# Patient Record
Sex: FEMALE | Race: BLACK OR AFRICAN AMERICAN | NOT HISPANIC OR LATINO | Employment: UNEMPLOYED | URBAN - METROPOLITAN AREA
[De-identification: names, ages, dates, MRNs, and addresses within clinical notes are randomized per-mention and may not be internally consistent; named-entity substitution may affect disease eponyms.]

---

## 2023-11-08 DIAGNOSIS — T14.8XXA MUSCLE STRAIN: Primary | ICD-10-CM

## 2023-11-09 DIAGNOSIS — I10 PRIMARY HYPERTENSION: Primary | ICD-10-CM

## 2023-11-09 RX ORDER — CYCLOBENZAPRINE HCL 5 MG
TABLET ORAL
Qty: 20 TABLET | Refills: 4 | Status: SHIPPED | OUTPATIENT
Start: 2023-11-09

## 2023-11-13 RX ORDER — CHLORTHALIDONE 25 MG/1
25 TABLET ORAL DAILY
Qty: 30 TABLET | Refills: 1 | Status: SHIPPED | OUTPATIENT
Start: 2023-11-13

## 2023-11-13 RX ORDER — LOSARTAN POTASSIUM 50 MG/1
50 TABLET ORAL DAILY
Qty: 30 TABLET | Refills: 1 | Status: SHIPPED | OUTPATIENT
Start: 2023-11-13

## 2024-01-24 ENCOUNTER — HOSPITAL ENCOUNTER (EMERGENCY)
Facility: HOSPITAL | Age: 54
Discharge: HOME | End: 2024-01-24
Payer: COMMERCIAL

## 2024-01-24 VITALS
OXYGEN SATURATION: 100 % | SYSTOLIC BLOOD PRESSURE: 142 MMHG | TEMPERATURE: 97.7 F | DIASTOLIC BLOOD PRESSURE: 94 MMHG | WEIGHT: 187 LBS | HEART RATE: 110 BPM | HEIGHT: 63 IN | BODY MASS INDEX: 33.13 KG/M2 | RESPIRATION RATE: 16 BRPM

## 2024-01-24 DIAGNOSIS — Z20.2 STD EXPOSURE: Primary | ICD-10-CM

## 2024-01-24 LAB
APPEARANCE UR: ABNORMAL
BILIRUB UR STRIP.AUTO-MCNC: NEGATIVE MG/DL
C TRACH RRNA SPEC QL NAA+PROBE: NEGATIVE
CLUE CELLS SPEC QL WET PREP: ABNORMAL
COLOR UR: YELLOW
GLUCOSE UR STRIP.AUTO-MCNC: NEGATIVE MG/DL
HOLD SPECIMEN: NORMAL
KETONES UR STRIP.AUTO-MCNC: NEGATIVE MG/DL
LEUKOCYTE ESTERASE UR QL STRIP.AUTO: ABNORMAL
N GONORRHOEA DNA SPEC QL PROBE+SIG AMP: NEGATIVE
NITRITE UR QL STRIP.AUTO: NEGATIVE
PH UR STRIP.AUTO: 6 [PH]
PROT UR STRIP.AUTO-MCNC: NEGATIVE MG/DL
RBC # UR STRIP.AUTO: NEGATIVE /UL
RBC #/AREA URNS AUTO: NORMAL /HPF
SP GR UR STRIP.AUTO: 1.02
SQUAMOUS #/AREA URNS AUTO: NORMAL /HPF
T VAGINALIS SPEC QL WET PREP: PRESENT
UROBILINOGEN UR STRIP.AUTO-MCNC: <2 MG/DL
WBC #/AREA URNS AUTO: NORMAL /HPF
WBC VAG QL WET PREP: ABNORMAL
YEAST VAG QL WET PREP: ABNORMAL

## 2024-01-24 PROCEDURE — 2500000001 HC RX 250 WO HCPCS SELF ADMINISTERED DRUGS (ALT 637 FOR MEDICARE OP): Performed by: PHYSICIAN ASSISTANT

## 2024-01-24 PROCEDURE — 99284 EMERGENCY DEPT VISIT MOD MDM: CPT | Performed by: PHYSICIAN ASSISTANT

## 2024-01-24 PROCEDURE — 99284 EMERGENCY DEPT VISIT MOD MDM: CPT

## 2024-01-24 PROCEDURE — 99283 EMERGENCY DEPT VISIT LOW MDM: CPT | Mod: 25

## 2024-01-24 PROCEDURE — 96372 THER/PROPH/DIAG INJ SC/IM: CPT

## 2024-01-24 PROCEDURE — 2500000004 HC RX 250 GENERAL PHARMACY W/ HCPCS (ALT 636 FOR OP/ED): Performed by: PHYSICIAN ASSISTANT

## 2024-01-24 PROCEDURE — 87800 DETECT AGNT MULT DNA DIREC: CPT | Performed by: PHYSICIAN ASSISTANT

## 2024-01-24 PROCEDURE — 81001 URINALYSIS AUTO W/SCOPE: CPT | Performed by: PHYSICIAN ASSISTANT

## 2024-01-24 PROCEDURE — 87086 URINE CULTURE/COLONY COUNT: CPT | Performed by: PHYSICIAN ASSISTANT

## 2024-01-24 PROCEDURE — 87210 SMEAR WET MOUNT SALINE/INK: CPT | Performed by: PHYSICIAN ASSISTANT

## 2024-01-24 RX ORDER — CEFTRIAXONE 500 MG/1
500 INJECTION, POWDER, FOR SOLUTION INTRAMUSCULAR; INTRAVENOUS ONCE
Status: COMPLETED | OUTPATIENT
Start: 2024-01-24 | End: 2024-01-24

## 2024-01-24 RX ORDER — METRONIDAZOLE 500 MG/1
2000 TABLET ORAL ONCE
Status: COMPLETED | OUTPATIENT
Start: 2024-01-24 | End: 2024-01-24

## 2024-01-24 RX ORDER — AZITHROMYCIN 500 MG/1
1000 TABLET, FILM COATED ORAL ONCE
Status: COMPLETED | OUTPATIENT
Start: 2024-01-24 | End: 2024-01-24

## 2024-01-24 RX ADMIN — AZITHROMYCIN DIHYDRATE 1000 MG: 500 TABLET ORAL at 09:42

## 2024-01-24 RX ADMIN — METRONIDAZOLE 2000 MG: 500 TABLET ORAL at 09:42

## 2024-01-24 RX ADMIN — CEFTRIAXONE SODIUM 500 MG: 500 INJECTION, POWDER, FOR SOLUTION INTRAMUSCULAR; INTRAVENOUS at 09:43

## 2024-01-24 ASSESSMENT — COLUMBIA-SUICIDE SEVERITY RATING SCALE - C-SSRS
1. IN THE PAST MONTH, HAVE YOU WISHED YOU WERE DEAD OR WISHED YOU COULD GO TO SLEEP AND NOT WAKE UP?: NO
6. HAVE YOU EVER DONE ANYTHING, STARTED TO DO ANYTHING, OR PREPARED TO DO ANYTHING TO END YOUR LIFE?: NO
2. HAVE YOU ACTUALLY HAD ANY THOUGHTS OF KILLING YOURSELF?: NO

## 2024-01-24 NOTE — ED PROVIDER NOTES
Emergency Department Encounter  [facility] EMERGENCY MEDICINE    Patient: Damir Cantu  MRN: [id number]  : 1970  Date of Evaluation: 2024  ED Provider: Faye Oneill PA-C      Chief Complaint       Chief Complaint   Patient presents with    Exposure to STD     HPI    Damir Cantu is a 53 y.o. female who presents to the emergency department presenting for STD exposure.  Patient reports that her partner was recently here and empirically treated for STD secondary to endorsing penile discharge.  Patient reports she has some vaginal irritation today.  No recent changes in soaps, lotions, detergents or other topicals.  Is requesting to be empirically treated today.  Needs to go to work and does not want to wait for her results.  No known drug allergies.    ROS:     Review of Systems  14 systems reviewed and otherwise acutely negative except as in the HPI.    Past History   No past medical history on file.  Past Surgical History:   Procedure Laterality Date    OTHER SURGICAL HISTORY  2021    Hysterectomy    OTHER SURGICAL HISTORY  2021    Ankle surgery     Social History     Socioeconomic History    Marital status: Single     Spouse name: Not on file    Number of children: Not on file    Years of education: Not on file    Highest education level: Not on file   Occupational History    Not on file   Tobacco Use    Smoking status: Not on file    Smokeless tobacco: Not on file   Substance and Sexual Activity    Alcohol use: Not on file    Drug use: Not on file    Sexual activity: Not on file   Other Topics Concern    Not on file   Social History Narrative    Not on file     Social Determinants of Health     Financial Resource Strain: Not on file   Food Insecurity: Not on file   Transportation Needs: Not on file   Physical Activity: Not on file   Stress: Not on file   Social Connections: Not on file   Intimate Partner Violence: Not on file   Housing Stability: Not on file        Medications/Allergies     Previous Medications    CHLORTHALIDONE (HYGROTON) 25 MG TABLET    TAKE 1 TABLET BY MOUTH DAILY    CYCLOBENZAPRINE (FLEXERIL) 5 MG TABLET    TAKE 1 TABLET AT BEDTIME FOR MUSCLE SPASMS    LOSARTAN (COZAAR) 50 MG TABLET    TAKE 1 TABLET DAILY     No Known Allergies     Physical Exam       ED Triage Vitals [01/24/24 0824]   Temperature Heart Rate Respirations BP   36.5 °C (97.7 °F) (!) 110 16 (!) 142/94      Pulse Ox Temp src Heart Rate Source Patient Position   100 % -- Monitor Sitting      BP Location FiO2 (%)     Right arm --         Physical Exam    Physical Exam:     VS: As documented in the triage note and EMR flowsheet from this visit were reviewed.    Appearance: Alert, oriented, cooperative, in no acute distress. Well nourished & well hydrated.    Skin: Warm and dry.     Neck: Supple, without meningismus.    Pulmonary: Clear bilaterally with good chest wall excursion. No rales, rhonchi or wheezing. No accessory muscle use or stridor.     Cardiac: Normal S1, S2    Abdomen: Soft, nontender, active bowel sounds. No rebound or guarding. No CVA tenderness.    Genitourinary: Chaperone declined. External exam unremarkable - no evidence of lesions, vesicles. Internal speculum reveals closed cervical os. No active bleeding, +small amount of thin white discharge. No cervical and/or adnexal tenderness to palpation on bimanual exam.    Musculoskeletal: Spontaneously moving all extremities without limitation. Extremities warm and well-perfused, capillary refill less than 2 seconds. Pulses full and equal.     Diagnostics   Labs:  Labs Reviewed   TRICHOMONAS WET PREP REFLEX TO PCR   C. TRACHOMATIS + N. GONORRHOEAE, AMPLIFIED   URINALYSIS WITH REFLEX CULTURE AND MICROSCOPIC    Narrative:     The following orders were created for panel order Urinalysis with Reflex Culture and Microscopic.  Procedure                               Abnormality         Status                     ---------              "                  -----------         ------                     Urinalysis with Reflex Cu...[[id number]]                                                 Extra Urine Gray Tube[[id number]]                                                          Please view results for these tests on the individual orders.   URINALYSIS WITH REFLEX CULTURE AND MICROSCOPIC   EXTRA URINE GRAY TUBE       ED Course   Visit Vitals  BP (!) 142/94 (BP Location: Right arm, Patient Position: Sitting)   Pulse (!) 110   Temp 36.5 °C (97.7 °F)   Resp 16   Ht 1.6 m (5' 3\")   Wt 84.8 kg (187 lb)   SpO2 100%   BMI 33.13 kg/m²   BSA 1.94 m²     Medications   metroNIDAZOLE (Flagyl) tablet 2,000 mg (has no administration in time range)   azithromycin (Zithromax) tablet 1,000 mg (has no administration in time range)   cefTRIAXone (Rocephin) vial 500 mg (has no administration in time range)       Medical Decision Making     Diagnoses as of 01/24/24 0942   STD exposure     Pelvic performed, pt wishes to be empirically treated.  Given oral Flagyl, Zithromax and IM Rocephin for empiric treatment of gonorrhea, chlamydia, and trichomonas.  Advised to remain abstinent for 7 days to ensure patient is fully treated.  Should also remain abstinent for 7 days since her partner has been treated to ensure that she does not become reinfected.  [facility] Left prior to results returning.      Final Impression      1. STD exposure          DISPOSITION  Disposition: discharge  Patient condition is: Stable    Comment: Please note this report has been produced using speech recognition software and may contain errors related to that system including errors in grammar, punctuation, and spelling, as well as words and phrases that may be inappropriate.  If there are any questions or concerns please feel free to contact the dictating provider for clarification.    RAY Haque PA-C  01/24/24 0942    "

## 2024-01-25 ENCOUNTER — TELEPHONE (OUTPATIENT)
Dept: PHARMACY | Facility: HOSPITAL | Age: 54
End: 2024-01-25
Payer: COMMERCIAL

## 2024-01-25 LAB — BACTERIA UR CULT: NO GROWTH

## 2024-01-25 NOTE — PROGRESS NOTES
EDPD Note: Antibiotics Reviewed and Warranted    Contacted Mr./Mrs./Ms. Damir Cantu regarding a positive Trichcomonas culture/result that was taken during their recent emergency room visit. I completed education with patient . The patient is being treated appropriately with Metronidazole 2g single dose in the ER. Patient states she is feeling a lot better. Advised patient follow up with PCP or urgent care if symptoms do not completely resolve.       Latest Reference Range & Units 01/24/24 09:49   Trichomonas None Seen  Present !   !: Data is abnormal       Savannah Schreiber, VaughnD

## 2024-07-01 ENCOUNTER — OFFICE VISIT (OUTPATIENT)
Dept: PRIMARY CARE | Facility: CLINIC | Age: 54
End: 2024-07-01
Payer: COMMERCIAL

## 2024-07-01 ENCOUNTER — TELEPHONE (OUTPATIENT)
Dept: PRIMARY CARE | Facility: CLINIC | Age: 54
End: 2024-07-01

## 2024-07-01 VITALS
DIASTOLIC BLOOD PRESSURE: 96 MMHG | SYSTOLIC BLOOD PRESSURE: 159 MMHG | HEIGHT: 63 IN | HEART RATE: 81 BPM | BODY MASS INDEX: 34.55 KG/M2 | WEIGHT: 195 LBS | TEMPERATURE: 96.6 F | RESPIRATION RATE: 14 BRPM | OXYGEN SATURATION: 99 %

## 2024-07-01 DIAGNOSIS — J45.20 MILD INTERMITTENT ASTHMA WITHOUT COMPLICATION (HHS-HCC): ICD-10-CM

## 2024-07-01 DIAGNOSIS — I50.22 CHRONIC SYSTOLIC CONGESTIVE HEART FAILURE (MULTI): ICD-10-CM

## 2024-07-01 DIAGNOSIS — Z11.3 SCREENING FOR STD (SEXUALLY TRANSMITTED DISEASE): ICD-10-CM

## 2024-07-01 DIAGNOSIS — Z12.31 ENCOUNTER FOR SCREENING MAMMOGRAM FOR MALIGNANT NEOPLASM OF BREAST: ICD-10-CM

## 2024-07-01 DIAGNOSIS — Z12.11 SCREEN FOR COLON CANCER: ICD-10-CM

## 2024-07-01 DIAGNOSIS — N95.1 VASOMOTOR SYMPTOMS DUE TO MENOPAUSE: ICD-10-CM

## 2024-07-01 DIAGNOSIS — F17.200 CURRENT SMOKER: ICD-10-CM

## 2024-07-01 DIAGNOSIS — Z00.00 HEALTHCARE MAINTENANCE: Primary | ICD-10-CM

## 2024-07-01 DIAGNOSIS — I10 UNCONTROLLED HYPERTENSION: ICD-10-CM

## 2024-07-01 DIAGNOSIS — F33.1 MODERATE EPISODE OF RECURRENT MAJOR DEPRESSIVE DISORDER (MULTI): ICD-10-CM

## 2024-07-01 LAB
ANION GAP SERPL CALC-SCNC: 15 MMOL/L (ref 10–20)
BUN SERPL-MCNC: 12 MG/DL (ref 6–23)
CALCIUM SERPL-MCNC: 9.9 MG/DL (ref 8.6–10.6)
CHLORIDE SERPL-SCNC: 106 MMOL/L (ref 98–107)
CHOLEST SERPL-MCNC: 210 MG/DL (ref 0–199)
CHOLESTEROL/HDL RATIO: 3.2
CO2 SERPL-SCNC: 27 MMOL/L (ref 21–32)
CREAT SERPL-MCNC: 0.68 MG/DL (ref 0.5–1.05)
EGFRCR SERPLBLD CKD-EPI 2021: >90 ML/MIN/1.73M*2
ERYTHROCYTE [DISTWIDTH] IN BLOOD BY AUTOMATED COUNT: 14.2 % (ref 11.5–14.5)
EST. AVERAGE GLUCOSE BLD GHB EST-MCNC: 88 MG/DL
GLUCOSE SERPL-MCNC: 113 MG/DL (ref 74–99)
HBA1C MFR BLD: 4.7 %
HCT VFR BLD AUTO: 38.3 % (ref 36–46)
HDLC SERPL-MCNC: 65.5 MG/DL
HGB BLD-MCNC: 13.1 G/DL (ref 12–16)
HIV 1+2 AB+HIV1 P24 AG SERPL QL IA: NONREACTIVE
LDLC SERPL CALC-MCNC: 121 MG/DL
MCH RBC QN AUTO: 30.7 PG (ref 26–34)
MCHC RBC AUTO-ENTMCNC: 34.2 G/DL (ref 32–36)
MCV RBC AUTO: 90 FL (ref 80–100)
NON HDL CHOLESTEROL: 145 MG/DL (ref 0–149)
NRBC BLD-RTO: 0 /100 WBCS (ref 0–0)
PLATELET # BLD AUTO: 324 X10*3/UL (ref 150–450)
POTASSIUM SERPL-SCNC: 4.5 MMOL/L (ref 3.5–5.3)
RBC # BLD AUTO: 4.27 X10*6/UL (ref 4–5.2)
SODIUM SERPL-SCNC: 143 MMOL/L (ref 136–145)
TREPONEMA PALLIDUM IGG+IGM AB [PRESENCE] IN SERUM OR PLASMA BY IMMUNOASSAY: NONREACTIVE
TRIGL SERPL-MCNC: 120 MG/DL (ref 0–149)
VLDL: 24 MG/DL (ref 0–40)
WBC # BLD AUTO: 10.1 X10*3/UL (ref 4.4–11.3)

## 2024-07-01 PROCEDURE — 80048 BASIC METABOLIC PNL TOTAL CA: CPT | Performed by: NURSE PRACTITIONER

## 2024-07-01 PROCEDURE — 99386 PREV VISIT NEW AGE 40-64: CPT | Performed by: NURSE PRACTITIONER

## 2024-07-01 PROCEDURE — 85027 COMPLETE CBC AUTOMATED: CPT | Performed by: NURSE PRACTITIONER

## 2024-07-01 PROCEDURE — 87491 CHLMYD TRACH DNA AMP PROBE: CPT | Performed by: NURSE PRACTITIONER

## 2024-07-01 PROCEDURE — 80061 LIPID PANEL: CPT | Performed by: NURSE PRACTITIONER

## 2024-07-01 PROCEDURE — 3080F DIAST BP >= 90 MM HG: CPT | Performed by: NURSE PRACTITIONER

## 2024-07-01 PROCEDURE — 87389 HIV-1 AG W/HIV-1&-2 AB AG IA: CPT | Performed by: NURSE PRACTITIONER

## 2024-07-01 PROCEDURE — 4004F PT TOBACCO SCREEN RCVD TLK: CPT | Performed by: NURSE PRACTITIONER

## 2024-07-01 PROCEDURE — 3077F SYST BP >= 140 MM HG: CPT | Performed by: NURSE PRACTITIONER

## 2024-07-01 PROCEDURE — 86780 TREPONEMA PALLIDUM: CPT | Performed by: NURSE PRACTITIONER

## 2024-07-01 PROCEDURE — 36415 COLL VENOUS BLD VENIPUNCTURE: CPT | Performed by: NURSE PRACTITIONER

## 2024-07-01 PROCEDURE — 83036 HEMOGLOBIN GLYCOSYLATED A1C: CPT | Performed by: NURSE PRACTITIONER

## 2024-07-01 PROCEDURE — 87661 TRICHOMONAS VAGINALIS AMPLIF: CPT | Performed by: NURSE PRACTITIONER

## 2024-07-01 SDOH — ECONOMIC STABILITY: FOOD INSECURITY: WITHIN THE PAST 12 MONTHS, THE FOOD YOU BOUGHT JUST DIDN'T LAST AND YOU DIDN'T HAVE MONEY TO GET MORE.: OFTEN TRUE

## 2024-07-01 SDOH — ECONOMIC STABILITY: FOOD INSECURITY: WITHIN THE PAST 12 MONTHS, YOU WORRIED THAT YOUR FOOD WOULD RUN OUT BEFORE YOU GOT MONEY TO BUY MORE.: OFTEN TRUE

## 2024-07-01 ASSESSMENT — ENCOUNTER SYMPTOMS
DEPRESSION: 0
LOSS OF SENSATION IN FEET: 0
OCCASIONAL FEELINGS OF UNSTEADINESS: 0

## 2024-07-01 ASSESSMENT — PATIENT HEALTH QUESTIONNAIRE - PHQ9
2. FEELING DOWN, DEPRESSED OR HOPELESS: NOT AT ALL
1. LITTLE INTEREST OR PLEASURE IN DOING THINGS: NOT AT ALL
SUM OF ALL RESPONSES TO PHQ9 QUESTIONS 1 AND 2: 0

## 2024-07-01 ASSESSMENT — PAIN SCALES - GENERAL: PAINLEVEL: 0-NO PAIN

## 2024-07-01 NOTE — ASSESSMENT & PLAN NOTE
Noted to be acute in 2019 2/2 cocaine abuse but follow up never completed. Patient has been otherwise lost to outpatient follow up. Uncontrolled HTN today, patient out of medication. Will refill medications, complete labs, and refer to cardiology for statusing.

## 2024-07-01 NOTE — PROGRESS NOTES
Subjective   Damir Cantu is a 53 y.o. female who presents for Annual Exam and ears (Ears check).  HPI  Ms. Lion is a 52 yo F here today for follow up.  Patient has been lost to follow up. LV 04/2023 with recommended close follow up for uncontrolled HTN at that time. Today has worsening of HTN.  Denies headache, chest pain, palpitations, blurred vision, or dizziness  Patient has not been taking blood pressure medications consistently, as she has run out of her prescription. She was following with another provider, so she did have medications for a period of time. Per chart review from Care Everywhere, patient has a history of acute CHF 2/2 cocaine abuse in 2019. No recent testing available. Will refer patient to cardiology for full evaluation. She understands the long term benefits of adequate blood pressure control.     She is currently smoking about 5 cigarettes per day. Notes a current exacerbation of depression (denies SI/HI) and that she has been without her depression medication that was given to her by a previous provider. She notes that she has been taking the medication for 10 years and needs a refill. She feels like she may consider quitting smoking once her depression is back under control. She endorses using marijuana daily. Denies other recent drug use.     She notes concern for ear fullness/ change in hearing. She notes a worsening to her R ear. Has some mild sinus congestion/ seasonal allergies. She has not tried any OTC medications for symptom management. Denies other associated symptoms.     Was seen in the ED on 1/24/24 with finding of trichamoniasis. Will repeat testing today. Denies current symptoms.    Does not have previous breast or colon cancer screening. Denies breast changes, nipple discharge, or black, bloody, or tarry bowel movements.    Would like a referral to OB for consultation regarding vasomotor symptom management.         All systems reviewed. Review of systems negative except  "for noted positives in HPI    Objective     BP (!) 159/96   Pulse 81   Temp 35.9 °C (96.6 °F)   Resp 14   Ht 1.6 m (5' 3\")   Wt 88.5 kg (195 lb)   SpO2 99%   BMI 34.54 kg/m²    Vital signs noted and reviewed.       Physical Exam  Constitutional:       Appearance: Normal appearance.   HENT:      Right Ear: Tympanic membrane is erythematous.   Cardiovascular:      Rate and Rhythm: Normal rate and regular rhythm.   Pulmonary:      Effort: Pulmonary effort is normal. No respiratory distress.      Breath sounds: Normal breath sounds.   Skin:     General: Skin is warm and dry.   Neurological:      Mental Status: She is oriented to person, place, and time.   Psychiatric:         Mood and Affect: Mood normal.             Assessment/Plan   Problem List Items Addressed This Visit       Chronic systolic congestive heart failure (Multi)     Noted to be acute in 2019 2/2 cocaine abuse but follow up never completed. Patient has been otherwise lost to outpatient follow up. Uncontrolled HTN today, patient out of medication. Will refill medications, complete labs, and refer to cardiology for statusing.          Relevant Orders    Referral to Cardiology    Mild intermittent asthma without complication ([facility]-HCC)     Well controlled currently. Smoking cessation discussed.           Other Visit Diagnoses       Screen for colon cancer    -  Primary    Relevant Orders    Colonoscopy Screening; Average Risk Patient    Encounter for screening mammogram for malignant neoplasm of breast        Relevant Orders    BI mammo bilateral screening tomosynthesis    Uncontrolled hypertension        Relevant Orders    Basic Metabolic Panel    Hemoglobin A1C    Lipid Panel    CBC    Referral to Cardiology    Current smoker        Moderate episode of recurrent major depressive disorder (Multi)        Screening for STD (sexually transmitted disease)        Relevant Orders    Syphilis Screen with Reflex    C. Trachomatis / N. Gonorrhoeae, Amplified " Detection    Trichomonas vaginalis, Nucleic Acid Detection    HIV 1/2 Antigen/Antibody Screen with Reflex to Confirmation    Vasomotor symptoms due to menopause        Relevant Orders    Referral to Obstetrics / Gynecology

## 2024-07-01 NOTE — PATIENT INSTRUCTIONS
Thank you for coming in for your visit today!    Please follow up with me in 8 weeks for blood pressure follow up.  We scheduled an appointment for you with cardiology for August 1st here at [facility].     For your blood pressure:  RESTART medications (chlorthalidone and losartan)  Take your medication as directed. Try to take it around the same time daily.   Keep a log of your blood pressure. Be sure to bring it with you to your next appointment so we can review it together.  Adhere to the DASH diet. This includes decreasing your salt/sodium intake. Avoid canned foods, lunch meats, and frozen foods.  Exercise for 30 minutes daily.    Today we completed blood work. We will contact you with any abnormalities from this testing.    For ear fullness:  Use fluticasone nasal spray 1-2x daily.  Take cetirizine for congestion/ allergies.     Please call us with the name of your depression medication.     Call to schedule screenings including mammogram and colonscopy. Referral phone numbers attached.  Call to schedule with Taiwo Jenkins for menopause consultation.    For your health, you need to quit smoking. We discussed resources available to you. When are you are ready to set a quit date we are available to you for help. Please contact our office or 1-800-QUIT-NOW for resources.     Cigarette smoking is one of the most preventable causes of death and disability in the [place]. We have many resources available to help you quit.    Reasons to Quit  Quitting tobacco will add years to your life  You will have healthier lungs, which decreases your risk of developing cancer, having a heart attack or stroke.  You will save a lot of money by not buying cigarettes.  You will have healthier skin and teeth.    How to Quit  Set a quit date within 2-4 weeks.  Throw away all cigarettes, matches, lighters, and ashtrays in your home, car, and workplace.  Make smoking very inconvenient.  Ask your family/friends for support and  encouragement  Stay in nonsmoking environments and avoid family/friends who smoke.  If you get the urge to smoke, take deep cleansing breaths and try to occupy your time with something else.  Leave the table and change your routine of smoking that you used to have such as smoking with your coffee or after meals.  Reward yourself often for staying smoke-free.  Smoking even one less cigarette per day counts.  It may take several attempts to finally quit.      Call 911 or go to the emergency room if you have pain in your chest, difficulty breathing, or other life threatening symptoms.

## 2024-07-02 LAB
C TRACH RRNA SPEC QL NAA+PROBE: NEGATIVE
N GONORRHOEA DNA SPEC QL PROBE+SIG AMP: NEGATIVE
T VAGINALIS RRNA SPEC QL NAA+PROBE: NEGATIVE

## 2024-07-30 DIAGNOSIS — I10 PRIMARY HYPERTENSION: ICD-10-CM

## 2024-07-30 RX ORDER — LOSARTAN POTASSIUM 50 MG/1
50 TABLET ORAL DAILY
Qty: 30 TABLET | Refills: 1 | Status: SHIPPED | OUTPATIENT
Start: 2024-07-30

## 2024-07-30 NOTE — TELEPHONE ENCOUNTER
Stopped by to request refill on losartan and other meds if possible..confirmed pharmacy and confirmed cardiology appointment upcoming..also gave her referral number to schedule mammogram appointments she missed

## 2024-07-31 ENCOUNTER — APPOINTMENT (OUTPATIENT)
Dept: OBSTETRICS AND GYNECOLOGY | Facility: HOSPITAL | Age: 54
End: 2024-07-31
Payer: COMMERCIAL

## 2024-08-02 ENCOUNTER — APPOINTMENT (OUTPATIENT)
Dept: OBSTETRICS AND GYNECOLOGY | Facility: HOSPITAL | Age: 54
End: 2024-08-02
Payer: COMMERCIAL

## 2024-08-02 DIAGNOSIS — I10 HYPERTENSION, UNSPECIFIED TYPE: Primary | ICD-10-CM

## 2024-08-05 ENCOUNTER — OFFICE VISIT (OUTPATIENT)
Dept: CARDIOLOGY | Facility: CLINIC | Age: 54
End: 2024-08-05
Payer: COMMERCIAL

## 2024-08-05 ENCOUNTER — APPOINTMENT (OUTPATIENT)
Dept: PHARMACY | Facility: HOSPITAL | Age: 54
End: 2024-08-05
Payer: COMMERCIAL

## 2024-08-05 VITALS
TEMPERATURE: 97.6 F | DIASTOLIC BLOOD PRESSURE: 86 MMHG | OXYGEN SATURATION: 98 % | HEIGHT: 63 IN | HEART RATE: 73 BPM | RESPIRATION RATE: 16 BRPM | WEIGHT: 192 LBS | SYSTOLIC BLOOD PRESSURE: 136 MMHG | BODY MASS INDEX: 34.02 KG/M2

## 2024-08-05 DIAGNOSIS — I50.21 ACUTE SYSTOLIC HEART FAILURE (MULTI): ICD-10-CM

## 2024-08-05 DIAGNOSIS — I10 ESSENTIAL HYPERTENSION: ICD-10-CM

## 2024-08-05 DIAGNOSIS — R06.09 DYSPNEA ON EXERTION: ICD-10-CM

## 2024-08-05 DIAGNOSIS — I10 HYPERTENSION, UNSPECIFIED TYPE: ICD-10-CM

## 2024-08-05 DIAGNOSIS — F17.200 CURRENT EVERY DAY SMOKER: ICD-10-CM

## 2024-08-05 DIAGNOSIS — E78.00 PURE HYPERCHOLESTEROLEMIA: ICD-10-CM

## 2024-08-05 DIAGNOSIS — I10 PRIMARY HYPERTENSION: ICD-10-CM

## 2024-08-05 DIAGNOSIS — I50.22 CHRONIC SYSTOLIC CONGESTIVE HEART FAILURE (MULTI): ICD-10-CM

## 2024-08-05 DIAGNOSIS — Z59.41 FOOD INSECURITY: ICD-10-CM

## 2024-08-05 DIAGNOSIS — I10 UNCONTROLLED HYPERTENSION: Primary | ICD-10-CM

## 2024-08-05 PROCEDURE — 3079F DIAST BP 80-89 MM HG: CPT | Performed by: REGISTERED NURSE

## 2024-08-05 PROCEDURE — 99407 BEHAV CHNG SMOKING > 10 MIN: CPT | Performed by: REGISTERED NURSE

## 2024-08-05 PROCEDURE — 93005 ELECTROCARDIOGRAM TRACING: CPT | Performed by: REGISTERED NURSE

## 2024-08-05 PROCEDURE — 99205 OFFICE O/P NEW HI 60 MIN: CPT | Performed by: REGISTERED NURSE

## 2024-08-05 PROCEDURE — 99215 OFFICE O/P EST HI 40 MIN: CPT | Performed by: REGISTERED NURSE

## 2024-08-05 PROCEDURE — 3008F BODY MASS INDEX DOCD: CPT | Performed by: REGISTERED NURSE

## 2024-08-05 PROCEDURE — 3075F SYST BP GE 130 - 139MM HG: CPT | Performed by: REGISTERED NURSE

## 2024-08-05 PROCEDURE — 4004F PT TOBACCO SCREEN RCVD TLK: CPT | Performed by: REGISTERED NURSE

## 2024-08-05 RX ORDER — CHLORTHALIDONE 25 MG/1
25 TABLET ORAL DAILY
Qty: 30 TABLET | Refills: 11 | Status: SHIPPED | OUTPATIENT
Start: 2024-08-05

## 2024-08-05 RX ORDER — AMLODIPINE BESYLATE 10 MG/1
TABLET ORAL DAILY
COMMUNITY
End: 2024-08-05

## 2024-08-05 RX ORDER — AMLODIPINE AND OLMESARTAN MEDOXOMIL 10; 40 MG/1; MG/1
1 TABLET ORAL NIGHTLY
Qty: 30 TABLET | Refills: 11 | Status: SHIPPED | OUTPATIENT
Start: 2024-08-05

## 2024-08-05 RX ORDER — PANTOPRAZOLE SODIUM 40 MG/1
40 TABLET, DELAYED RELEASE ORAL
COMMUNITY

## 2024-08-05 RX ORDER — DIPHENHYDRAMINE HCL 25 MG
4 CAPSULE ORAL AS NEEDED
Qty: 100 EACH | Refills: 0 | Status: SHIPPED | OUTPATIENT
Start: 2024-08-05 | End: 2024-09-04

## 2024-08-05 RX ORDER — VITAMIN E MIXED 400 UNIT
CAPSULE ORAL DAILY
COMMUNITY

## 2024-08-05 RX ORDER — BENZONATATE 100 MG/1
100 CAPSULE ORAL 3 TIMES DAILY PRN
COMMUNITY
End: 2024-08-05 | Stop reason: WASHOUT

## 2024-08-05 RX ORDER — IBUPROFEN 200 MG
1 TABLET ORAL EVERY 24 HOURS
Qty: 30 PATCH | Refills: 0 | Status: SHIPPED | OUTPATIENT
Start: 2024-08-05 | End: 2024-09-04

## 2024-08-05 RX ORDER — ROSUVASTATIN CALCIUM 20 MG/1
20 TABLET, COATED ORAL NIGHTLY
Qty: 30 TABLET | Refills: 11 | Status: SHIPPED | OUTPATIENT
Start: 2024-08-05 | End: 2025-08-05

## 2024-08-05 RX ORDER — SERTRALINE HYDROCHLORIDE 100 MG/1
100 TABLET, FILM COATED ORAL DAILY
COMMUNITY

## 2024-08-05 SDOH — ECONOMIC STABILITY - FOOD INSECURITY: FOOD INSECURITY: Z59.41

## 2024-08-05 ASSESSMENT — ENCOUNTER SYMPTOMS
COUGH: 0
EXCESSIVE DAYTIME SLEEPINESS: 0
WEIGHT GAIN: 0
SHORTNESS OF BREATH: 0
DYSPNEA ON EXERTION: 1
ORTHOPNEA: 0
IRREGULAR HEARTBEAT: 0
INSOMNIA: 1
SYNCOPE: 0
PND: 0
PALPITATIONS: 0
CLAUDICATION: 0
NEAR-SYNCOPE: 0

## 2024-08-05 ASSESSMENT — PAIN SCALES - GENERAL: PAINLEVEL: 0-NO PAIN

## 2024-08-05 NOTE — PROGRESS NOTES
Cardiology Clinic Note    Reason for Visit: No chief complaint on file..  Referring Clinician: Arley     History of Present Illness: Damir Cantu is a 53 y.o. female who presents as a new patient. 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 She is here to establish cardiac care as she has not had much follow up since 2019. She is taking her blood pressure medications as prescribed but BP remains above goal of 130/80. She reports some dyspnea with exertion when walking up flights of stairs but chalks it up to her cigarette smoking.  She is motivated to quit smoking and would like some help. She denies PND, orthopnea, lower extremity edema, fatigue, or chest discomfort. She does not engage in structured physical activity and endorses diet high in fried and high sodium foods.  She denies any relevant cardiac family medical history.   She had been on lisinopril but developed cough.   It does not appear she was     Past Medical History:  She has no past medical history on file.    Past Surgical History:  She has a past surgical history that includes Other surgical history (03/28/2021) and Other surgical history (03/28/2021).    Social History:  She reports that she has been smoking cigarettes. She has never used smokeless tobacco. She reports current alcohol use of about 2.0 standard drinks of alcohol per week. She reports current drug use. Drugs: Marijuana and Morphine.    Family History:  No family history on file.    Allergies:  Lisinopril    Outpatient Medications:  Current Outpatient Medications   Medication Instructions    amlodipine-olmesartan (Dimitri) 10-40 mg tablet 1 tablet, oral, Nightly    benzonatate (TESSALON) 100 mg, oral, 3 times daily PRN, Do not crush or chew.    chlorthalidone (HYGROTON) 25 mg, oral, Daily    cyclobenzaprine (Flexeril) 5 mg tablet TAKE 1 TABLET AT  "BEDTIME FOR MUSCLE SPASMS    dextromethorphan-guaifenesin (Mucinex DM)  mg 12 hr tablet 1 tablet, oral, Every 12 hours, Do not crush, chew, or split.    nicotine (Nicoderm CQ) 14 mg/24 hr patch 1 patch, transdermal, Every 24 hours    nicotine polacrilex (NICORETTE) 4 mg, Mouth/Throat, As needed    pantoprazole (PROTONIX) 40 mg, oral, Daily before breakfast, Do not crush, chew, or split.    rosuvastatin (CRESTOR) 20 mg, oral, Nightly    sertraline (ZOLOFT) 100 mg, oral, Daily    vitamin E 450 mg (1000 unit) capsule oral, Daily       Review of Systems:  Review of Systems   Constitutional: Negative for malaise/fatigue and weight gain.   Cardiovascular:  Positive for dyspnea on exertion. Negative for chest pain, claudication, irregular heartbeat, leg swelling, near-syncope, orthopnea, palpitations, paroxysmal nocturnal dyspnea and syncope.   Respiratory:  Negative for cough and shortness of breath.    Neurological:  Negative for excessive daytime sleepiness.   Psychiatric/Behavioral:  The patient has insomnia.        Last Recorded Vitals:  Vitals:    08/05/24 0912   BP: 136/86   Pulse: 73   Resp: 16   Temp: 36.4 °C (97.6 °F)   SpO2: 98%   Weight: 87.1 kg (192 lb)   Height: 1.588 m (5' 2.5\")       Physical Examination:  GENERAL: NAD, central adiposity   HEENT: no JVD  CV: RRR without m/r/g  PULM: CTAB  EXT: non-edematous bilateral lower extremities    Laboratory Studies:  Lab Results   Component Value Date    GLUCOSE 113 (H) 07/01/2024    CALCIUM 9.9 07/01/2024     07/01/2024    K 4.5 07/01/2024    CO2 27 07/01/2024     07/01/2024    BUN 12 07/01/2024    CREATININE 0.68 07/01/2024     No results found for: \"ALT\", \"AST\", \"GGT\", \"ALKPHOS\", \"BILITOT\"      Lab Results   Component Value Date    CHOL 210 (H) 07/01/2024     Lab Results   Component Value Date    HDL 65.5 07/01/2024     Lab Results   Component Value Date    LDLCALC 121 (H) 07/01/2024     Lab Results   Component Value Date    TRIG 120 07/01/2024 " "    No components found for: \"CHOLHDL\"  Lab Results   Component Value Date    HGBA1C 4.7 07/01/2024     No components found for: \"UACR\"    Cardiology Tests:  ECG:No results found for this or any previous visit from the past 1095 days.      Echo:No results found for this or any previous visit from the past 1095 days.      Cath:No results found for this or any previous visit from the past 1095 days.      Stress Test:No results found for this or any previous visit from the past 1095 days.    Cardiac Imaging:No results found for this or any previous visit from the past 1095 days.      The 10-year ASCVD risk score (Farida COLLAZO, et al., 2019) is: 10.4%    Values used to calculate the score:      Age: 53 years      Sex: Female      Is Non- : Yes      Diabetic: No      Tobacco smoker: Yes      Systolic Blood Pressure: 136 mmHg      Is BP treated: Yes      HDL Cholesterol: 65.5 mg/dL      Total Cholesterol: 210 mg/dL     Assessment and Plan:  Problem List Items Addressed This Visit       Acute systolic heart failure (Multi)     Hospitalization in December 2019 at [facility], presented with hypertensive crisis and pulmonary edema requiring mechanical ventilation and BiPAP.    Unclear etiology, likely ischemic cardiomyopathy related to hypertensive crisis and cocaine use, or tachycardia-induced cardiomyopathy. Troponin negative x1 and EKG did not demonstrate dynamic changes. BNP 3140. Ejection Fraction - Result: 34 % Date: 12/13/2019 and wall motion abnormalities noted on echocardiogram: the entire anterior wall, entire lateral wall, entire septum, entire apex, and entire inferior wall are mildly hypokinetic.     No cardiac follow up afterwards. No s/s of fluid overload today.   Echo/stress testing to further evaluate. She continues to refrain from cocaine use.          Essential hypertension     Goal BP less than 130/80. Only taking chlorthalidone 25mg and amlodipine 10mg. Will add ARB to med regimen in form of " single-pill combination therapy. Advised to check BP at home.          Current every day smoker     Assessed readiness to quit. She is motivated and requested additional help. Provided smoking cessation resources including NRT.          Relevant Medications    nicotine (Nicoderm CQ) 14 mg/24 hr patch    nicotine polacrilex (Nicorette) 4 mg gum    Other Relevant Orders    Referral to Tobacco Cessation Counseling    Transthoracic Echo (TTE) Complete    Nuclear Stress Test    Food insecurity     [employer] referral provided         Relevant Orders    Referral to [employer]    Pure hypercholesterolemia     ASCVD risk 10.4%. Start on rosuvastatin 20mg. Reviewed modifiable risk factors, encouraged to quit smoking, eliminate fried foods.          Relevant Medications    rosuvastatin (Crestor) 20 mg tablet    Dyspnea on exertion     Stress test and echo ordered to evaluate for structural heart disease. Encouraged smoking cessation, improved BP control.           Other Visit Diagnoses       Uncontrolled hypertension    -  Primary    Relevant Medications    amlodipine-olmesartan (Dimitri) 10-40 mg tablet    Other Relevant Orders    ECG 12 lead (Clinic Performed) (Completed)    Primary hypertension        Relevant Medications    chlorthalidone (Hygroton) 25 mg tablet            Follow up in 2 months after testing.      Lupe Purvis DNP, APRN-CNP  Clinician,  [facility]  Lead Clinician, [facility]

## 2024-08-05 NOTE — ASSESSMENT & PLAN NOTE
ASCVD risk 10.4%. Start on rosuvastatin 20mg. Reviewed modifiable risk factors, encouraged to quit smoking, eliminate fried foods.

## 2024-08-05 NOTE — ASSESSMENT & PLAN NOTE
Hospitalization in December 2019 at [facility], presented with hypertensive crisis and pulmonary edema requiring mechanical ventilation and BiPAP.    Unclear etiology, likely ischemic cardiomyopathy related to hypertensive crisis and cocaine use, or tachycardia-induced cardiomyopathy. Troponin negative x1 and EKG did not demonstrate dynamic changes. BNP 3140. Ejection Fraction - Result: 34 % Date: 12/13/2019 and wall motion abnormalities noted on echocardiogram: the entire anterior wall, entire lateral wall, entire septum, entire apex, and entire inferior wall are mildly hypokinetic.     No cardiac follow up afterwards. No s/s of fluid overload today.   Echo/stress testing to further evaluate. She continues to refrain from cocaine use.

## 2024-08-05 NOTE — ASSESSMENT & PLAN NOTE
Assessed readiness to quit. She is motivated and requested additional help. Provided smoking cessation resources including NRT.

## 2024-08-05 NOTE — PROGRESS NOTES
Primary Care Clinical Pharmacist Initial Visit      Date of Initial Visit: 8/5/24  Disease state(s) to be managed by clinical pharmacist:  HTN  Referring Provider: JERONIMO Oscar(PCP)  Most recent appointment with PCP: 7/1/24  Next appointment with PCP: not yet scheduled  Cardiologist: RAYMOND Jon  Most recent appointment with cardiology: today (8/5/24)  Next appointment with cardiology:              Subjective:    Patient is a 54 YO F who presents in-person to visit with clinical pharmacist for initial visit for management of HTN. Patient was referred to clinical pharmacist for management of disease state(s) by PCP. At today's visit, patient reports taking losartan 50mg PO daily, chlorthalidone 25mg PO daily, an amlodipine 10mg PO daily. Patient reports compliance with meds and denies any ADRs to meds. Patient denies any signs/symptoms of hypotension and also denies any signs/symptoms of stroke.    Objective:       BP reading at today's visit is     mmHg (goal: less than 130/80mmHg).    Below are patient's home BP readings for the past 2 weeks:    SBP:                          (Avg =  , goal: less than 130mmHg)     DBP:                         (Avg =   , goal: less than 80mmHg)    Pulse:                       (Avg =   , goal:  bpm)    Most recent Na+: 143 (7/1/24)  Most recent K+:  4.5 (7/1/24)  Most recent SCr: 0.68 (7/1/24)         Assessment:    Patient's DMT2, DMT1, HTN, AC, Afib, HPL, CHF, asthma, COPD is currently controlled/uncontrolled.    Plan:    Continue/increase/decrease/stop         The patient verbalized understanding of everything discussed at today's visit and agrees with plan formulated by clinical pharmacist    Next visit with clinical pharmacist:        Continue all meds under the continuation of care with the referring provider and clinical pharmacy team.    Patient Education    The patient was counseled on the  following regarding DMT2, DMT1, HTN, AC, Afib, HPL, CHF, asthma, COPD:    - The signs/symptoms of DMT2, DMT1, HTN, DVT, PE, heart attack, stroke, CHF, asthma, COPD    - The side effects of the medications patient uses to treat disease state(s) and what to do if they occur    - To report any changes to medications to clinical pharmacist as soon as they occur    - To seek emergency medical attention if any unusual bleeding/bruising, signs/symptoms of thrombosis occurs        YOBANY Melendez, PharmD, CPh, BCACP  Clinical Pharmacy Specialist, [facility]

## 2024-08-05 NOTE — ASSESSMENT & PLAN NOTE
Goal BP less than 130/80. Only taking chlorthalidone 25mg and amlodipine 10mg. Will add ARB to med regimen in form of single-pill combination therapy. Advised to check BP at home.

## 2024-08-06 DIAGNOSIS — Z12.11 COLON CANCER SCREENING: Primary | ICD-10-CM

## 2024-08-06 RX ORDER — SODIUM PICOSULFATE, MAGNESIUM OXIDE, AND ANHYDROUS CITRIC ACID 12; 3.5; 1 G/175ML; G/175ML; MG/175ML
LIQUID ORAL
Qty: 350 ML | Refills: 0 | Status: SHIPPED | OUTPATIENT
Start: 2024-08-06

## 2024-08-27 ENCOUNTER — APPOINTMENT (OUTPATIENT)
Dept: RADIOLOGY | Facility: HOSPITAL | Age: 54
End: 2024-08-27
Payer: COMMERCIAL

## 2024-08-27 ENCOUNTER — APPOINTMENT (OUTPATIENT)
Dept: CARDIOLOGY | Facility: HOSPITAL | Age: 54
End: 2024-08-27
Payer: COMMERCIAL

## 2024-08-27 ENCOUNTER — HOSPITAL ENCOUNTER (OUTPATIENT)
Dept: RADIOLOGY | Facility: HOSPITAL | Age: 54
Setting detail: OUTPATIENT SURGERY
End: 2024-08-27
Payer: COMMERCIAL

## 2024-08-27 ENCOUNTER — APPOINTMENT (OUTPATIENT)
Dept: GASTROENTEROLOGY | Facility: HOSPITAL | Age: 54
End: 2024-08-27
Payer: COMMERCIAL

## 2024-08-27 RX ORDER — SODIUM CHLORIDE, SODIUM LACTATE, POTASSIUM CHLORIDE, CALCIUM CHLORIDE 600; 310; 30; 20 MG/100ML; MG/100ML; MG/100ML; MG/100ML
20 INJECTION, SOLUTION INTRAVENOUS CONTINUOUS
OUTPATIENT
Start: 2024-08-27

## 2024-09-04 DIAGNOSIS — F17.200 CURRENT EVERY DAY SMOKER: ICD-10-CM

## 2024-09-05 RX ORDER — DIPHENHYDRAMINE HCL 25 MG
4 CAPSULE ORAL AS NEEDED
Qty: 90 EACH | Refills: 2 | Status: SHIPPED | OUTPATIENT
Start: 2024-09-05 | End: 2024-10-05

## 2024-10-07 ENCOUNTER — APPOINTMENT (OUTPATIENT)
Dept: CARDIOLOGY | Facility: CLINIC | Age: 54
End: 2024-10-07
Payer: COMMERCIAL

## 2025-05-24 DIAGNOSIS — F17.200 CURRENT EVERY DAY SMOKER: ICD-10-CM

## 2025-05-27 RX ORDER — DIPHENHYDRAMINE HCL 25 MG
4 CAPSULE ORAL AS NEEDED
Qty: 90 EACH | Refills: 3 | Status: SHIPPED | OUTPATIENT
Start: 2025-05-27 | End: 2025-06-26

## 2025-08-09 DIAGNOSIS — I10 PRIMARY HYPERTENSION: ICD-10-CM

## 2025-08-09 DIAGNOSIS — E78.00 PURE HYPERCHOLESTEROLEMIA: ICD-10-CM

## 2025-08-11 ENCOUNTER — APPOINTMENT (OUTPATIENT)
Dept: PRIMARY CARE | Facility: CLINIC | Age: 55
End: 2025-08-11

## 2025-08-11 DIAGNOSIS — I10 UNCONTROLLED HYPERTENSION: ICD-10-CM

## 2025-08-11 DIAGNOSIS — I10 PRIMARY HYPERTENSION: ICD-10-CM

## 2025-08-11 DIAGNOSIS — E78.00 PURE HYPERCHOLESTEROLEMIA: ICD-10-CM

## 2025-08-11 RX ORDER — ROSUVASTATIN CALCIUM 20 MG/1
20 TABLET, COATED ORAL NIGHTLY
Qty: 30 TABLET | Refills: 11 | Status: SHIPPED | OUTPATIENT
Start: 2025-08-11 | End: 2025-08-11

## 2025-08-11 RX ORDER — CHLORTHALIDONE 25 MG/1
25 TABLET ORAL DAILY
Qty: 30 TABLET | Refills: 11 | Status: SHIPPED | OUTPATIENT
Start: 2025-08-11 | End: 2025-08-11

## 2025-08-11 RX ORDER — AMLODIPINE BESYLATE AND OLMESARTAN MEDOXOMIL 10; 40 MG/1; MG/1
1 TABLET ORAL NIGHTLY
Qty: 90 TABLET | Refills: 0 | Status: SHIPPED | OUTPATIENT
Start: 2025-08-11

## 2025-08-11 RX ORDER — ROSUVASTATIN CALCIUM 20 MG/1
20 TABLET, COATED ORAL NIGHTLY
Qty: 90 TABLET | Refills: 0 | Status: SHIPPED | OUTPATIENT
Start: 2025-08-11 | End: 2026-08-11

## 2025-08-11 RX ORDER — CHLORTHALIDONE 25 MG/1
25 TABLET ORAL DAILY
Qty: 90 TABLET | Refills: 0 | Status: SHIPPED | OUTPATIENT
Start: 2025-08-11